# Patient Record
Sex: FEMALE | Race: OTHER
[De-identification: names, ages, dates, MRNs, and addresses within clinical notes are randomized per-mention and may not be internally consistent; named-entity substitution may affect disease eponyms.]

---
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
## 2022-01-21 ENCOUNTER — HOSPITAL ENCOUNTER (EMERGENCY)
Dept: HOSPITAL 76 - ED | Age: 22
Discharge: HOME | End: 2022-01-21
Payer: MEDICAID

## 2022-01-21 VITALS — SYSTOLIC BLOOD PRESSURE: 135 MMHG | DIASTOLIC BLOOD PRESSURE: 87 MMHG

## 2022-01-21 DIAGNOSIS — F43.10: ICD-10-CM

## 2022-01-21 DIAGNOSIS — F41.9: Primary | ICD-10-CM

## 2022-01-21 DIAGNOSIS — F31.62: ICD-10-CM

## 2022-01-21 LAB
AMPHET UR QL SCN: NEGATIVE
ANION GAP SERPL CALCULATED.4IONS-SCNC: 12 MMOL/L (ref 6–13)
APAP SERPL-MCNC: < 10 UG/ML (ref 10–30)
BARBITURATES UR QL SCN>300 NG/ML: NEGATIVE
BASOPHILS NFR BLD AUTO: 0 10^3/UL (ref 0–0.1)
BASOPHILS NFR BLD AUTO: 0.5 %
BENZODIAZ UR QL SCN: NEGATIVE
BUN SERPL-MCNC: 12 MG/DL (ref 6–20)
CALCIUM UR-MCNC: 9.5 MG/DL (ref 8.5–10.3)
CHLORIDE SERPL-SCNC: 102 MMOL/L (ref 101–111)
CO2 SERPL-SCNC: 23 MMOL/L (ref 21–32)
COCAINE UR-SCNC: NEGATIVE UMOL/L
CREAT SERPLBLD-SCNC: 0.7 MG/DL (ref 0.4–1)
EOSINOPHIL # BLD AUTO: 0.2 10^3/UL (ref 0–0.7)
EOSINOPHIL NFR BLD AUTO: 2 %
ERYTHROCYTE [DISTWIDTH] IN BLOOD BY AUTOMATED COUNT: 13.6 % (ref 12–15)
ETHANOL BLD-MCNC: < 5 MG/DL
GFRSERPLBLD MDRD-ARVRAT: 108 ML/MIN/{1.73_M2} (ref 89–?)
GLUCOSE SERPL-MCNC: 89 MG/DL (ref 70–100)
HCG UR QL: NEGATIVE
HCT VFR BLD AUTO: 41.7 % (ref 37–47)
HGB UR QL STRIP: 14 G/DL (ref 12–16)
LYMPHOCYTES # SPEC AUTO: 1.7 10^3/UL (ref 1.5–3.5)
LYMPHOCYTES NFR BLD AUTO: 20.3 %
MCH RBC QN AUTO: 29.2 PG (ref 27–31)
MCHC RBC AUTO-ENTMCNC: 33.6 G/DL (ref 32–36)
MCV RBC AUTO: 87.1 FL (ref 81–99)
METHADONE UR QL SCN: NEGATIVE
METHAMPHET UR QL SCN: NEGATIVE
MONOCYTES # BLD AUTO: 0.9 10^3/UL (ref 0–1)
MONOCYTES NFR BLD AUTO: 10.2 %
NEUTROPHILS # BLD AUTO: 5.6 10^3/UL (ref 1.5–6.6)
NEUTROPHILS # SNV AUTO: 8.5 X10^3/UL (ref 4.8–10.8)
NEUTROPHILS NFR BLD AUTO: 66.6 %
NRBC # BLD AUTO: 0 /100WBC
NRBC # BLD AUTO: 0 X10^3/UL
OPIATES UR QL SCN: NEGATIVE
PDW BLD AUTO: 9.5 FL (ref 7.9–10.8)
PLATELET # BLD: 244 10^3/UL (ref 130–450)
POTASSIUM SERPL-SCNC: 3.3 MMOL/L (ref 3.5–5)
RBC MAR: 4.79 10^6/UL (ref 4.2–5.4)
SALICYLATES SERPL-MCNC: < 6 MG/DL
SODIUM SERPLBLD-SCNC: 137 MMOL/L (ref 135–145)
THC UR QL SCN: POSITIVE
VOLATILE DRUGS POS SERPL SCN: (no result)

## 2022-01-21 PROCEDURE — 99284 EMERGENCY DEPT VISIT MOD MDM: CPT

## 2022-01-21 PROCEDURE — 80329 ANALGESICS NON-OPIOID 1 OR 2: CPT

## 2022-01-21 PROCEDURE — 80307 DRUG TEST PRSMV CHEM ANLYZR: CPT

## 2022-01-21 PROCEDURE — 99283 EMERGENCY DEPT VISIT LOW MDM: CPT

## 2022-01-21 PROCEDURE — 80306 DRUG TEST PRSMV INSTRMNT: CPT

## 2022-01-21 PROCEDURE — 85025 COMPLETE CBC W/AUTO DIFF WBC: CPT

## 2022-01-21 PROCEDURE — 80048 BASIC METABOLIC PNL TOTAL CA: CPT

## 2022-01-21 PROCEDURE — 84443 ASSAY THYROID STIM HORMONE: CPT

## 2022-01-21 PROCEDURE — 36415 COLL VENOUS BLD VENIPUNCTURE: CPT

## 2022-01-21 PROCEDURE — 80320 DRUG SCREEN QUANTALCOHOLS: CPT

## 2022-01-21 PROCEDURE — 81025 URINE PREGNANCY TEST: CPT
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
## 2022-01-21 NOTE — ED PHYSICIAN DOCUMENTATION
PD HPI MHE





- Stated complaint


Stated Complaint: MHE





- Chief complaint


Chief Complaint: MHE





- History obtained from


History obtained from: Patient





- History of Present Illness


Timing - onset: How many days ago (worsening over past week)


Pain level max: 0


Pain level now: 0


Recently seen: Not recently seen





- Additional information


Additional information: 





c/o one week of gradually worsening insomnia, anxiety and tremulousness, 

decreased appetite with decreased PO intake, labile mood (Im OK one minute, 

then crying the next), feels detatched from reality, anhedonia (unable to find 

pleasure in hobbies she used to enjoy, with many of these hobbies having been 

successfully used for coping skills in the past). I ask about thought of self-

harm, and she vaguely indicates she sometimes has these thoughts but she says 

she has no specific plan and adamant that she has no desire to kill herself.





Review of Systems


Constitutional: reports: Reviewed and negative


Cardiac: reports: Reviewed and negative


Respiratory: reports: Reviewed and negative


GI: reports: Reviewed and negative


: denies: Now pregnant EGA


Skin: reports: Reviewed and negative


Musculoskeletal: reports: Reviewed and negative


Neurologic: reports: Reviewed and negative





PD PAST MEDICAL HISTORY





- Past Medical History


Cardiovascular: None


Respiratory: None


Neuro: None


Endocrine/Autoimmune: None


GI: None


GYN: None


: None


HEENT: None


Psych: None


Musculoskeletal: None


Derm: None





- Past Surgical History


Past Surgical History: No





- Present Medications


Home Medications: 


                                Ambulatory Orders











 Medication  Instructions  Recorded  Confirmed


 


Cetirizine [ZyrTEC] 10 mg PO ONCE 01/21/22 01/21/22


 


LORazepam [Ativan] 0.5 - 1 mg PO Q6H PRN #14 tablet 01/21/22 


 


OLANZapine ODT [Zyprexa Odt] 5 mg TL HS #20 tablet 01/21/22 














- Allergies


Allergies/Adverse Reactions: 


                                    Allergies











Allergy/AdvReac Type Severity Reaction Status Date / Time


 


No Known Drug Allergies Allergy   Verified 01/21/22 04:29














- Social History


Does the pt smoke?: No


Smoking Status: Never smoker


Does the pt drink ETOH?: No


Does the pt have substance abuse?: No





- Immunizations


Immunizations are current?: Yes





- POLST


Patient has POLST: No





PD ED PE NORMAL





- Vitals


Vital signs reviewed: Yes





- General


General: Alert and oriented X 3, Well developed/nourished, Other (in providing 

list of symptoms during HPI, she becomes increasingly visually anxious, wringing

her hands and eventually crying and hyperventilating. she is redirectable. 

polite and conversant. )





- HEENT


HEENT: Moist mucous membranes





- Neck


Neck: Supple, no meningeal sign





- Cardiac


Cardiac: RRR, No murmur





- Respiratory


Respiratory: No respiratory distress, Clear bilaterally





- Abdomen


Abdomen: Soft, Non tender





- Neuro


Neuro: Alert and oriented X 3





PD ED PE EXPANDED





- Psych


Psych: Tearful, Anxious





Results





- Vitals


Vitals: 


                                     Oxygen











O2 Source                      Room air

















- Labs


Labs: 


                                Laboratory Tests











  01/21/22 01/21/22 01/21/22





  05:00 05:00 05:10


 


WBC    8.5


 


RBC    4.79


 


Hgb    14.0


 


Hct    41.7


 


MCV    87.1


 


MCH    29.2


 


MCHC    33.6


 


RDW    13.6


 


Plt Count    244


 


MPV    9.5


 


Neut # (Auto)    5.6


 


Lymph # (Auto)    1.7


 


Mono # (Auto)    0.9


 


Eos # (Auto)    0.2


 


Baso # (Auto)    0.0


 


Absolute Nucleated RBC    0.00


 


Nucleated RBC %    0.0


 


Sodium   


 


Potassium   


 


Chloride   


 


Carbon Dioxide   


 


Anion Gap   


 


BUN   


 


Creatinine   


 


Estimated GFR (MDRD)   


 


Glucose   


 


Calcium   


 


TSH   


 


Urine HCG, Qual   NEGATIVE 


 


Salicylates   


 


Urine Opiates Screen  NEGATIVE  


 


Ur Oxycodone Screen  NEGATIVE  


 


Urine Methadone Screen  NEGATIVE  


 


Ur Propoxyphene Screen  NEGATIVE  


 


Acetaminophen   


 


Ur Barbiturates Screen  NEGATIVE  


 


Ur Tricyclics Screen  NEGATIVE  


 


Ur Phencyclidine Scrn  NEGATIVE  


 


Ur Amphetamine Screen  NEGATIVE  


 


U Methamphetamines Scrn  NEGATIVE  


 


U Benzodiazepines Scrn  NEGATIVE  


 


Urine Cocaine Screen  NEGATIVE  


 


U Cannabinoids Screen  POSITIVE H  


 


Ethyl Alcohol   














  01/21/22 01/21/22 01/21/22





  05:10 05:10 05:10


 


WBC   


 


RBC   


 


Hgb   


 


Hct   


 


MCV   


 


MCH   


 


MCHC   


 


RDW   


 


Plt Count   


 


MPV   


 


Neut # (Auto)   


 


Lymph # (Auto)   


 


Mono # (Auto)   


 


Eos # (Auto)   


 


Baso # (Auto)   


 


Absolute Nucleated RBC   


 


Nucleated RBC %   


 


Sodium  137  


 


Potassium  3.3 L  


 


Chloride  102  


 


Carbon Dioxide  23  


 


Anion Gap  12.0  


 


BUN  12  


 


Creatinine  0.7  


 


Estimated GFR (MDRD)  108  


 


Glucose  89  


 


Calcium  9.5  


 


TSH   1.22 


 


Urine HCG, Qual   


 


Salicylates    < 6.0


 


Urine Opiates Screen   


 


Ur Oxycodone Screen   


 


Urine Methadone Screen   


 


Ur Propoxyphene Screen   


 


Acetaminophen    < 10 L


 


Ur Barbiturates Screen   


 


Ur Tricyclics Screen   


 


Ur Phencyclidine Scrn   


 


Ur Amphetamine Screen   


 


U Methamphetamines Scrn   


 


U Benzodiazepines Scrn   


 


Urine Cocaine Screen   


 


U Cannabinoids Screen   


 


Ethyl Alcohol    < 5.0














PD MEDICAL DECISION MAKING





- ED course


Complexity details: reviewed old records, reviewed results, re-evaluated 

patient, considered differential, d/w patient


ED course: 





patient presents to ED requesting help, as she feels her symptoms have gotten 

beyond her control of late. She exhibits some of the behaviors, seemingly 

unintentionally, that have been a problem: when describing her symptoms, she 

becomes increasingly anxious, wringing hands, starting to rock back and forth 

subtly, and then crying. I ask her to stop for just a moment and she takes a 

deep breath and acknowledges she was once again getting wound up. In this way, 

she is easily redirectable. I discussed options,such as telepsychiatry, social 

work. In futher discussing these options, she again began to become overwhelmed 

and defers to me. Consults for both services are ordered.


She is given a dose of lorazepam in ED with good effect; she was far more calm, 

both visually and by her report. 


Telepsych consult recommends QHS zyprexa and this is transmitted to her 

pharmacy. I also transmitted a brief course of lorazepam for day use. I advised 

patient that lorazepam is to be used sparingly and is potentially addictive, and

is a poor choice for long-term control of anxiety. She has expressed all along 

on this visit that she does not want mediation that just makes her groggy or 

sleep too much. After telepsych , patient says she has a ride (relative) waiting

outside and that she (patient) feels safe being discharged. She is again offered

SW consult, as they might be able to expedite outpatient f/u, but patient 

declines. She is able to contract for safety, will return if she feels unsafe at

any time, and she will pursue outpatient follow up





Departure





- Departure


Disposition: 01 Home, Self Care


Clinical Impression: 


 Anxiety





Condition: Good


Instructions:  Anxiety Disorder


Prescriptions: 


LORazepam [Ativan] 0.5 - 1 mg PO Q6H PRN #14 tablet


 PRN Reason: Anxiety


OLANZapine ODT [Zyprexa Odt] 5 mg TL HS #20 tablet


Comments: 


Prescriptions for olanzepine (for sleep) and lorazepam (for anxiety) have been 

electronically submitted to [facility] pharmacy in [place]. 


You can two tablets of the olanzepine for sleep, but if makes you sleep too long

or feel drowsy or sleepy the next day, decrease the dose to one tablet. 





Discharge Date/Time: 01/21/22 10:22

## 2022-02-13 ENCOUNTER — HOSPITAL ENCOUNTER (OUTPATIENT)
Dept: HOSPITAL 76 - EMS | Age: 22
Discharge: ANOTHER HOSPITAL | End: 2022-02-13
Payer: MEDICAID

## 2022-02-13 ENCOUNTER — HOSPITAL ENCOUNTER (EMERGENCY)
Dept: HOSPITAL 76 - ED | Age: 22
Discharge: HOME | End: 2022-02-13
Payer: MEDICAID

## 2022-02-13 VITALS — DIASTOLIC BLOOD PRESSURE: 94 MMHG | SYSTOLIC BLOOD PRESSURE: 139 MMHG

## 2022-02-13 DIAGNOSIS — F31.9: ICD-10-CM

## 2022-02-13 DIAGNOSIS — S71.111A: ICD-10-CM
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
## 2022-02-13 DIAGNOSIS — F43.0: Primary | ICD-10-CM

## 2022-02-13 DIAGNOSIS — R45.89: Primary | ICD-10-CM

## 2022-02-13 DIAGNOSIS — F43.10: ICD-10-CM
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
## 2022-02-13 DIAGNOSIS — F10.129: ICD-10-CM

## 2022-02-13 DIAGNOSIS — T43.596A: ICD-10-CM

## 2022-02-13 DIAGNOSIS — F41.9: ICD-10-CM

## 2022-02-13 DIAGNOSIS — Z91.138: ICD-10-CM

## 2022-02-13 DIAGNOSIS — G47.00: ICD-10-CM

## 2022-02-13 DIAGNOSIS — Z81.8: ICD-10-CM

## 2022-02-13 LAB
ALBUMIN DIAFP-MCNC: 4 G/DL (ref 3.2–5.5)
ALBUMIN/GLOB SERPL: 1.2 {RATIO} (ref 1–2.2)
ALP SERPL-CCNC: 56 IU/L (ref 42–121)
ALT SERPL W P-5'-P-CCNC: 21 IU/L (ref 10–60)
AMPHET UR QL SCN: NEGATIVE
ANION GAP SERPL CALCULATED.4IONS-SCNC: 11 MMOL/L (ref 6–13)
APAP SERPL-MCNC: < 10 UG/ML (ref 10–30)
AST SERPL W P-5'-P-CCNC: 19 IU/L (ref 10–42)
B-HCG SERPL QL: NEGATIVE
BARBITURATES UR QL SCN>300 NG/ML: NEGATIVE
BASOPHILS NFR BLD AUTO: 0.1 10^3/UL (ref 0–0.1)
BASOPHILS NFR BLD AUTO: 0.7 %
BENZODIAZ UR QL SCN: NEGATIVE
BILIRUB BLD-MCNC: 0.9 MG/DL (ref 0.2–1)
BUN SERPL-MCNC: 8 MG/DL (ref 6–20)
CALCIUM UR-MCNC: 8.9 MG/DL (ref 8.5–10.3)
CHLORIDE SERPL-SCNC: 107 MMOL/L (ref 101–111)
CLARITY UR REFRACT.AUTO: CLEAR
CO2 SERPL-SCNC: 20 MMOL/L (ref 21–32)
COCAINE UR-SCNC: NEGATIVE UMOL/L
CREAT SERPLBLD-SCNC: 0.6 MG/DL (ref 0.4–1)
EOSINOPHIL # BLD AUTO: 0.8 10^3/UL (ref 0–0.7)
EOSINOPHIL NFR BLD AUTO: 8.6 %
ERYTHROCYTE [DISTWIDTH] IN BLOOD BY AUTOMATED COUNT: 14.6 % (ref 12–15)
ETHANOL BLD-MCNC: 125.5 MG/DL
GFRSERPLBLD MDRD-ARVRAT: 129 ML/MIN/{1.73_M2} (ref 89–?)
GLOBULIN SER-MCNC: 3.3 G/DL (ref 2.1–4.2)
GLUCOSE SERPL-MCNC: 92 MG/DL (ref 70–100)
GLUCOSE UR QL STRIP.AUTO: NEGATIVE MG/DL
HCT VFR BLD AUTO: 41.8 % (ref 37–47)
HGB UR QL STRIP: 13.8 G/DL (ref 12–16)
KETONES UR QL STRIP.AUTO: NEGATIVE MG/DL
LIPASE SERPL-CCNC: 37 U/L (ref 22–51)
LYMPHOCYTES # SPEC AUTO: 3.2 10^3/UL (ref 1.5–3.5)
LYMPHOCYTES NFR BLD AUTO: 33.8 %
MCH RBC QN AUTO: 28.5 PG (ref 27–31)
MCHC RBC AUTO-ENTMCNC: 33 G/DL (ref 32–36)
MCV RBC AUTO: 86.4 FL (ref 81–99)
METHADONE UR QL SCN: NEGATIVE
METHAMPHET UR QL SCN: NEGATIVE
MONOCYTES # BLD AUTO: 0.9 10^3/UL (ref 0–1)
MONOCYTES NFR BLD AUTO: 9.1 %
NEUTROPHILS # BLD AUTO: 4.5 10^3/UL (ref 1.5–6.6)
NEUTROPHILS # SNV AUTO: 9.5 X10^3/UL (ref 4.8–10.8)
NEUTROPHILS NFR BLD AUTO: 47.2 %
NITRITE UR QL STRIP.AUTO: NEGATIVE
NRBC # BLD AUTO: 0 /100WBC
NRBC # BLD AUTO: 0 X10^3/UL
OPIATES UR QL SCN: NEGATIVE
PDW BLD AUTO: 9 FL (ref 7.9–10.8)
PH UR STRIP.AUTO: 5.5 PH (ref 5–7.5)
PLATELET # BLD: 323 10^3/UL (ref 130–450)
POTASSIUM SERPL-SCNC: 4.1 MMOL/L (ref 3.5–5)
PROT SPEC-MCNC: 7.3 G/DL (ref 6.7–8.2)
PROT UR STRIP.AUTO-MCNC: NEGATIVE MG/DL
RBC # UR STRIP.AUTO: (no result) /UL
RBC # URNS HPF: (no result) /HPF (ref 0–5)
RBC MAR: 4.84 10^6/UL (ref 4.2–5.4)
SALICYLATES SERPL-MCNC: < 6 MG/DL
SODIUM SERPLBLD-SCNC: 138 MMOL/L (ref 135–145)
SP GR UR STRIP.AUTO: >=1.03 (ref 1–1.03)
SQUAMOUS URNS QL MICRO: (no result)
THC UR QL SCN: POSITIVE
UROBILINOGEN UR QL STRIP.AUTO: (no result) E.U./DL
UROBILINOGEN UR STRIP.AUTO-MCNC: NEGATIVE MG/DL
VOLATILE DRUGS POS SERPL SCN: (no result)
WBC # UR MANUAL: (no result) /HPF (ref 0–5)

## 2022-02-13 PROCEDURE — 84703 CHORIONIC GONADOTROPIN ASSAY: CPT

## 2022-02-13 PROCEDURE — 99284 EMERGENCY DEPT VISIT MOD MDM: CPT

## 2022-02-13 PROCEDURE — 80306 DRUG TEST PRSMV INSTRMNT: CPT

## 2022-02-13 PROCEDURE — 80307 DRUG TEST PRSMV CHEM ANLYZR: CPT

## 2022-02-13 PROCEDURE — 80329 ANALGESICS NON-OPIOID 1 OR 2: CPT

## 2022-02-13 PROCEDURE — 87086 URINE CULTURE/COLONY COUNT: CPT

## 2022-02-13 PROCEDURE — 36415 COLL VENOUS BLD VENIPUNCTURE: CPT

## 2022-02-13 PROCEDURE — 85025 COMPLETE CBC W/AUTO DIFF WBC: CPT

## 2022-02-13 PROCEDURE — 99283 EMERGENCY DEPT VISIT LOW MDM: CPT

## 2022-02-13 PROCEDURE — 81001 URINALYSIS AUTO W/SCOPE: CPT

## 2022-02-13 PROCEDURE — 80320 DRUG SCREEN QUANTALCOHOLS: CPT

## 2022-02-13 PROCEDURE — 80053 COMPREHEN METABOLIC PANEL: CPT

## 2022-02-13 PROCEDURE — 81003 URINALYSIS AUTO W/O SCOPE: CPT

## 2022-02-13 PROCEDURE — 84443 ASSAY THYROID STIM HORMONE: CPT

## 2022-02-13 PROCEDURE — 83690 ASSAY OF LIPASE: CPT

## 2022-02-13 NOTE — ED PHYSICIAN DOCUMENTATION
ED Addendum





- Addendum


Addendum: 





02/13/22 13:41The patient remained calm cooperative and interacting well here in

the ER since change of shift.  The prior provider had requested a telepsych 

consult but as of this point the still were not able to do that because of 

consult volume.  Social work instead talked with the patient and her  and

they were both feeling comfortable with her being home.  The patient contracted 

for safety with social work.  They did need a refill of their prescription that 

had been transmitted to the wrong pharmacy.  We tried contacting [facility]

and see if they could transfer it but it was going to take a few days apparently

according to [facility].  I therefore just represcribed it.





The patient's says that she feels comfortable not do any self-harm.  She told me

she would avoid alcohol.  She feels comfortable going home.





Disposition: The patient is discharged from the ER stable to home





Diagnoses:


1.  Acute stress reaction


2.  Intentional self cutting


3.  Anxiety disorder


4.  Alcohol intoxication     Overnight events noted   VITAL: T(C): , Max: 36.7 (03-02-21 @ 09:13) T(F): , Max: 98 (03-02-21 @ 09:13) HR: 65 (03-03-21 @ 04:48) BP: 111/65 (03-03-21 @ 04:48) BP(mean): -- RR: 18 (03-03-21 @ 04:48) SpO2: 98% (03-03-21 @ 04:48)   PHYSICAL EXAM: Constitutional: NAD, Alert HEENT: NCAT, MMM Neck: Supple, No JVD Respiratory: CTA-R; decreased BS L base Cardiovascular: RRR s1s2, no m/r/g Gastrointestinal: BS+, soft, NT/ND Extremities: No peripheral edema b/l Neurological: no focal deficits; strength grossly intact Back: no CVAT b/l Skin: No rashes, no nevi Access: LUE AVF (+)thrill LABS:                      12.5  4.00  )-----------( 96       ( 02 Mar 2021 03:34 )            40.2   Na(147)/K(5.6)/Cl(101)/HCO3(26)/BUN(83)/Cr(5.88)Glu(190)/Ca(10.6)/Mg(--)/PO4(--)    03-02 @ 06:30 Na(145)/K(6.1)/Cl(98)/HCO3(24)/BUN(84)/Cr(6.01)Glu(125)/Ca(11.1)/Mg(--)/PO4(--)    03-02 @ 03:36 Na(144)/K(5.9)/Cl(98)/HCO3(26)/BUN(84)/Cr(6.07)Glu(124)/Ca(11.2)/Mg(2.9)/PO4(5.8)    03-02 @ 03:34    IMPRESSION: 71M w/ dementia, CAD, past epidural abscess, and ESRD, 3/2/21 a/w AMS  (1)Renal - ESRD - HD TTS - due for next HD tomorrow (2)Hyperkalemia - should now be improved, s/p HD yesterday (no labs of yet for today) (3)Hypercalcemia - primary hyperparathyroidism - on Sensipar (4)AMS - sepsis-associated? Now on Zosyn   RECOMMEND: (1)Next HD tomorrow - low K/low Ca bath (2)Sensipar as ordered (3)Continue abx for GFR<10/HD    Heath Huff MD [facility] Office: [phone] Cell: [phone]        No pain, no sob   VITAL: T(C): , Max: 36.7 (03-02-21 @ 09:13) T(F): , Max: 98 (03-02-21 @ 09:13) HR: 65 (03-03-21 @ 04:48) BP: 111/65 (03-03-21 @ 04:48) RR: 18 (03-03-21 @ 04:48) SpO2: 98% (03-03-21 @ 04:48)   PHYSICAL EXAM: Constitutional: lethargic but alert (improved from yesterday) HEENT: NCAT, MMM Neck: Supple, No JVD Respiratory: CTA-R; decreased BS L base Cardiovascular: RRR s1s2, no m/r/g Gastrointestinal: BS+, soft, NT/ND Extremities: No peripheral edema b/l Neurological: no focal deficits; strength grossly intact Back: no CVAT b/l Skin: No rashes, no nevi Access: LUE AVF (+)thrill LABS:                      12.5  4.00  )-----------( 96       ( 02 Mar 2021 03:34 )            40.2   Na(147)/K(5.6)/Cl(101)/HCO3(26)/BUN(83)/Cr(5.88)Glu(190)/Ca(10.6)/Mg(--)/PO4(--)    03-02 @ 06:30 Na(145)/K(6.1)/Cl(98)/HCO3(24)/BUN(84)/Cr(6.01)Glu(125)/Ca(11.1)/Mg(--)/PO4(--)    03-02 @ 03:36 Na(144)/K(5.9)/Cl(98)/HCO3(26)/BUN(84)/Cr(6.07)Glu(124)/Ca(11.2)/Mg(2.9)/PO4(5.8)    03-02 @ 03:34    IMPRESSION: 71M w/ dementia, CAD, past epidural abscess, and ESRD, 3/2/21 a/w AMS  (1)Renal - ESRD - HD TTS - due for next HD tomorrow (2)Hyperkalemia - should now be improved, s/p HD yesterday (no labs of yet for today) (3)Hypercalcemia - primary hyperparathyroidism - on Sensipar (4)AMS - sepsis-associated? Now on Zosyn   RECOMMEND: (1)Next HD tomorrow - low K/low Ca bath (2)Sensipar as ordered (3)Continue abx for GFR<10/HD    Heath Huff MD [facility] Office: [phone] Cell: [phone]
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
## 2022-04-19 ENCOUNTER — HOSPITAL ENCOUNTER (EMERGENCY)
Dept: HOSPITAL 76 - ED | Age: 22
Discharge: HOME | End: 2022-04-19
Payer: COMMERCIAL

## 2022-04-19 VITALS — SYSTOLIC BLOOD PRESSURE: 117 MMHG | DIASTOLIC BLOOD PRESSURE: 70 MMHG

## 2022-04-19 DIAGNOSIS — R10.13: ICD-10-CM

## 2022-04-19 DIAGNOSIS — R11.2: Primary | ICD-10-CM

## 2022-04-19 LAB
ALBUMIN DIAFP-MCNC: 4.4 G/DL (ref 3.2–5.5)
ALBUMIN/GLOB SERPL: 1.2 {RATIO} (ref 1–2.2)
ALP SERPL-CCNC: 63 IU/L (ref 42–121)
ALT SERPL W P-5'-P-CCNC: 27 IU/L (ref 10–60)
ANION GAP SERPL CALCULATED.4IONS-SCNC: 7 MMOL/L (ref 6–13)
AST SERPL W P-5'-P-CCNC: 23 IU/L (ref 10–42)
BASOPHILS NFR BLD AUTO: 0.1 10^3/UL (ref 0–0.1)
BASOPHILS NFR BLD AUTO: 1 %
BILIRUB BLD-MCNC: 1 MG/DL (ref 0.2–1)
BUN SERPL-MCNC: 9 MG/DL (ref 6–20)
CALCIUM UR-MCNC: 9.2 MG/DL (ref 8.5–10.3)
CHLORIDE SERPL-SCNC: 105 MMOL/L (ref 101–111)
CLARITY UR REFRACT.AUTO: CLEAR
CO2 SERPL-SCNC: 27 MMOL/L (ref 21–32)
CREAT SERPLBLD-SCNC: 0.6 MG/DL (ref 0.4–1)
EOSINOPHIL # BLD AUTO: 0.8 10^3/UL (ref 0–0.7)
EOSINOPHIL NFR BLD AUTO: 8.6 %
ERYTHROCYTE [DISTWIDTH] IN BLOOD BY AUTOMATED COUNT: 13 % (ref 12–15)
GFRSERPLBLD MDRD-ARVRAT: 129 ML/MIN/{1.73_M2} (ref 89–?)
GLOBULIN SER-MCNC: 3.6 G/DL (ref 2.1–4.2)
GLUCOSE SERPL-MCNC: 93 MG/DL (ref 70–100)
GLUCOSE UR QL STRIP.AUTO: NEGATIVE MG/DL
HCG UR QL: NEGATIVE
HCT VFR BLD AUTO: 46 % (ref 37–47)
HGB UR QL STRIP: 14.8 G/DL (ref 12–16)
KETONES UR QL STRIP.AUTO: NEGATIVE MG/DL
LIPASE SERPL-CCNC: 32 U/L (ref 22–51)
LYMPHOCYTES # SPEC AUTO: 2.4 10^3/UL (ref 1.5–3.5)
LYMPHOCYTES NFR BLD AUTO: 27.5 %
MCH RBC QN AUTO: 28.9 PG (ref 27–31)
MCHC RBC AUTO-ENTMCNC: 32.2 G/DL (ref 32–36)
MCV RBC AUTO: 89.8 FL (ref 81–99)
MONOCYTES # BLD AUTO: 0.6 10^3/UL (ref 0–1)
MONOCYTES NFR BLD AUTO: 6.6 %
NEUTROPHILS # BLD AUTO: 4.9 10^3/UL (ref 1.5–6.6)
NEUTROPHILS # SNV AUTO: 8.7 X10^3/UL (ref 4.8–10.8)
NEUTROPHILS NFR BLD AUTO: 56.1 %
NITRITE UR QL STRIP.AUTO: NEGATIVE
NRBC # BLD AUTO: 0 /100WBC
NRBC # BLD AUTO: 0 X10^3/UL
PDW BLD AUTO: 9.5 FL (ref 7.9–10.8)
PH UR STRIP.AUTO: 8 PH (ref 5–7.5)
PLATELET # BLD: 287 10^3/UL (ref 130–450)
POTASSIUM SERPL-SCNC: 4.3 MMOL/L (ref 3.5–5)
PROT SPEC-MCNC: 8 G/DL (ref 6.7–8.2)
PROT UR STRIP.AUTO-MCNC: NEGATIVE MG/DL
RBC # UR STRIP.AUTO: NEGATIVE /UL
RBC MAR: 5.12 10^6/UL (ref 4.2–5.4)
SODIUM SERPLBLD-SCNC: 139 MMOL/L (ref 135–145)
SP GR UR STRIP.AUTO: 1.02 (ref 1–1.03)
UROBILINOGEN UR QL STRIP.AUTO: (no result) E.U./DL
UROBILINOGEN UR STRIP.AUTO-MCNC: NEGATIVE MG/DL

## 2022-04-19 PROCEDURE — 96375 TX/PRO/DX INJ NEW DRUG ADDON: CPT

## 2022-04-19 PROCEDURE — 96374 THER/PROPH/DIAG INJ IV PUSH: CPT

## 2022-04-19 PROCEDURE — 74177 CT ABD & PELVIS W/CONTRAST: CPT

## 2022-04-19 PROCEDURE — 36415 COLL VENOUS BLD VENIPUNCTURE: CPT

## 2022-04-19 PROCEDURE — 80053 COMPREHEN METABOLIC PANEL: CPT

## 2022-04-19 PROCEDURE — 81003 URINALYSIS AUTO W/O SCOPE: CPT

## 2022-04-19 PROCEDURE — 99284 EMERGENCY DEPT VISIT MOD MDM: CPT

## 2022-04-19 PROCEDURE — 99283 EMERGENCY DEPT VISIT LOW MDM: CPT

## 2022-04-19 PROCEDURE — 85025 COMPLETE CBC W/AUTO DIFF WBC: CPT

## 2022-04-19 PROCEDURE — 83690 ASSAY OF LIPASE: CPT

## 2022-04-19 PROCEDURE — 81025 URINE PREGNANCY TEST: CPT

## 2022-04-19 PROCEDURE — 81001 URINALYSIS AUTO W/SCOPE: CPT

## 2022-04-19 PROCEDURE — 87086 URINE CULTURE/COLONY COUNT: CPT

## 2022-04-19 NOTE — CT REPORT
PROCEDURE:  Abdomen/Pelvis W

 

INDICATIONS:  periumbilical pain/vomiting

 

CONTRAST:  IV CONTRAST: Optiray 320 ml: 100 PO CONTRAST: *NO PO CONTRAST  

 

TECHNIQUE:  

After the administration of intravenous contrast, 5 mm thick sections acquired from the diaphragms to
 the symphysis.  5 mm thick coronal and sagittal reformats were acquired.  For radiation dose reducti
on, the following was used:  automated exposure control, adjustment of mA and/or kV according to livier
ent size.  

 

COMPARISON:  None.

 

FINDINGS:  

Image quality: Patient motion artifact.  

 

ABDOMEN:  

Lung bases:  Lung bases are clear.  Heart size is normal.  

 

Solid organs:  Liver and spleen are normal in size and enhancement.

 

The gallbladder is likely secondary to patient motion artifact. This gives the appearance of gallblad
werner wall thickening. Biliary system is non dilated.  Pancreas enhances normally.  No adrenal nodules.
  Kidneys demonstrate normal size and enhancement, without hydronephrosis.  

 

Peritoneum and bowel:  Bowel loops demonstrate normal wall thickness and caliber.  No free fluid or a
ir.  

 

Nodes and vessels:  No retroperitoneal or mesenteric adenopathy by size criteria.  Aorta and inferior
 vena cava are normal in size.  Incidental note is made of the presence of a dilated left canal vein 
indicating reflux with associated left paraovarian varicosities.

 

Miscellaneous:  No ventral hernias.  

 

 

PELVIS:  

Genitourinary:  Bladder wall thickness is normal.  

 

Miscellaneous:  No inguinal hernias or adenopathy.  2.0 cm left adnexal cyst.

 

Bones:  No suspicious bony lesions.  No vertebral body compression fractures.  

 

IMPRESSION:  

 

1. There is patient motion artifact on the study.

 

2. Patient motion artifact likely results in the appearance of the gallbladder, as opposed to the pre
sence of renal gallbladder wall thickening. However, if there is suspicion of gallstone disease, cons
ider right upper quadrant ultrasound.

 

3. Nonspecific 2.0 cm left adnexal cyst.

 

4. Incidental note made of left gonadal vein reflux with left paraovarian varicosities.

 

Comment: In certain patients, left gonadal vein reflux with paraovarian varicosities can correlate wi
th the clinical syndrome of chronic pelvic venous congestion.

 

Reviewed by: Vincent Marx MD on 4/19/2022 8:35 PM PDT

Approved by: Vincent Marx MD on 4/19/2022 8:35 PM PDT

 

 

Station ID:  [id number]

## 2022-04-19 NOTE — ED PHYSICIAN DOCUMENTATION
PD HPI NVD





- Stated complaint


Stated Complaint: VOMIT MUCUS/CONGESTION





- Chief complaint


Chief Complaint: Abd Pain





- History obtained from


History obtained from: Patient





- Additonal information


Additional information: 


Patient is a 21-year-old female with no significant past medical history 

presenting for evaluation of nausea and vomiting that has been present for 3 

days.  Patient reports not being able to eat or drink very much at any time as 

she will vomit 10 to 15 minutes later.  She also reports in the morning she is 

vomiting mucus.  Nonbloody nonbilious.  She has had 1 loose stool each day for 

the last 3 days.  She describes a vague generalized abdominal discomfort.  No 

dysuria, hematuria.  No vaginal discharge or pelvic pain.  No sick contacts, 

recent antibiotic use, travel.Nothing makes her symptoms better.








Review of Systems


Constitutional: denies: Fever


Nose: denies: Congestion


Cardiac: denies: Chest pain / pressure, Palpitations


Respiratory: denies: Dyspnea, Cough


GI: reports: Abdominal Pain, Nausea, Vomiting, Diarrhea


: denies: Dysuria, Hematuria, Discharge, Vaginal bleeding


Skin: denies: Rash


Musculoskeletal: denies: Back pain


Neurologic: denies: Generalized weakness, Headache





PD PAST MEDICAL HISTORY





- Past Medical History


Cardiovascular: None


Respiratory: None


Neuro: None


Endocrine/Autoimmune: None


GI: None


GYN: None


: None


HEENT: None


Psych: None


Musculoskeletal: None


Derm: None


Other Past Medical History: seasonal allergies





- Past Surgical History


Past Surgical History: No





- Present Medications


Home Medications: 


                                Ambulatory Orders











 Medication  Instructions  Recorded  Confirmed


 


OLANZapine [Zyprexa] 10 mg PO HS 30 Days #30 tablet 02/13/22 04/19/22


 


hydrOXYzine pamoate [Hydroxyzine 25 mg PO BID PRN #20 cap 02/13/22 04/19/22





Pamoate]   


 


Cetirizine HCl [Zyrtec] 10 mg PO DAILY 04/19/22 04/19/22


 


Ondansetron Odt [Zofran] 4 mg TL Q6H PRN #10 tablet 04/19/22 














- Allergies


Allergies/Adverse Reactions: 


                                    Allergies











Allergy/AdvReac Type Severity Reaction Status Date / Time


 


iodine AdvReac  Hives Verified 04/19/22 21:08














- Social History


Does the pt smoke?: No


Smoking Status: Never smoker


Does the pt drink ETOH?: No


Does the pt have substance abuse?: No





- Immunizations


Immunizations are current?: Yes





- POLST


Patient has POLST: No





PD ED PE NORMAL





- General


General: Alert and oriented X 3, No acute distress, Well developed/nourished





- HEENT


HEENT: Atraumatic, Moist mucous membranes





- Neck


Neck: Supple, no meningeal sign





- Cardiac


Cardiac: RRR, No murmur, Strong equal pulses





- Respiratory


Respiratory: No respiratory distress, Clear bilaterally





- Abdomen


Abdomen: Normal bowel sounds, Soft, Non distended, Other (Periumbilical 

tenderness, no rebound, no guarding, no masses)





- Derm


Derm: Normal color, Warm and dry





- Extremities


Extremities: No deformity, No edema





- Neuro


Neuro: Alert and oriented X 3, No motor deficit, Normal speech





- Psych


Psych: Normal mood, Normal affect





Results





- Vitals


Vitals: 


                               Vital Signs - 24 hr











  04/19/22 04/19/22 04/19/22





  16:53 16:55 18:55


 


Temperature 36.7 C  


 


Heart Rate 87 87 87


 


Respiratory 16 16 16





Rate   


 


Blood Pressure 120/75 120/75 120/75


 


O2 Saturation 99 99 99














  04/19/22 04/19/22





  20:00 21:40


 


Temperature  


 


Heart Rate 95 95


 


Respiratory 18 18





Rate  


 


Blood Pressure 117/70 117/70


 


O2 Saturation 99 99








                                     Oxygen











O2 Source                      Room air

















- Labs


Labs: 


                                Laboratory Tests











  04/19/22 04/19/22 04/19/22





  17:04 17:05 17:05


 


WBC   8.7 


 


RBC   5.12 


 


Hgb   14.8 


 


Hct   46.0 


 


MCV   89.8 


 


MCH   28.9 


 


MCHC   32.2 


 


RDW   13.0 


 


Plt Count   287 


 


MPV   9.5 


 


Neut # (Auto)   4.9 


 


Lymph # (Auto)   2.4 


 


Mono # (Auto)   0.6 


 


Eos # (Auto)   0.8 H 


 


Baso # (Auto)   0.1 


 


Absolute Nucleated RBC   0.00 


 


Nucleated RBC %   0.0 


 


Sodium    139


 


Potassium    4.3


 


Chloride    105


 


Carbon Dioxide    27


 


Anion Gap    7.0


 


BUN    9


 


Creatinine    0.6


 


Estimated GFR (MDRD)    129


 


Glucose    93


 


Calcium    9.2


 


Total Bilirubin    1.0


 


AST    23


 


ALT    27


 


Alkaline Phosphatase    63


 


Total Protein    8.0


 


Albumin    4.4


 


Globulin    3.6


 


Albumin/Globulin Ratio    1.2


 


Lipase    32


 


Urine Color  YELLOW  


 


Urine Clarity  CLEAR  


 


Urine pH  8.0 H  


 


Ur Specific Gravity  1.025  


 


Urine Protein  NEGATIVE  


 


Urine Glucose (UA)  NEGATIVE  


 


Urine Ketones  NEGATIVE  


 


Urine Occult Blood  NEGATIVE  


 


Urine Nitrite  NEGATIVE  


 


Urine Bilirubin  NEGATIVE  


 


Urine Urobilinogen  0.2 (NORMAL)  


 


Ur Leukocyte Esterase  NEGATIVE  


 


Ur Microscopic Review  NOT INDICATED  


 


Urine Culture Comments  NOT INDICATED  


 


Urine HCG, Qual  NEGATIVE  














PD MEDICAL DECISION MAKING





- ED course


Complexity details: reviewed results, re-evaluated patient, d/w patient


ED course: 


Patient presenting for evaluation of Periumbilical abdominal pain with nausea 

and vomiting.  She has mild tenderness on exam but overall labs are reassuring 

and vital signs are stable.  CT was obtained with incidental findings of ovarian

 cyst and possible findings to suggest chronic pelvic congestion syndrome.  I 

did review these findings with the patient and encouraged close follow-up with 

primary care doctor or gynecologist.  Patient's was given medications to help 

with her symptoms and is able to tolerate p.o. prior to discharge.  She did have

 some hives after receiving IV contrast andWas given Benadryl with quick 

improvement.  No respiratory symptoms.Patient advised on strict return 

precautions. No lower abdominal tenderness to suggest pelvic etiology.








Departure





- Departure


Disposition: 01 Home, Self Care


Clinical Impression: 


 Epigastric abdominal pain





Nausea & vomiting


Qualifiers:


 Vomiting type: unspecified Qualified Code(s): R11.2 - Nausea with vomiting, 

unspecified





Condition: Stable


Instructions:  ED Abdominal Pain Female Non-Specific Abdominal Pain, ED Diet 

Vomiting Diarrhea


Prescriptions: 


Ondansetron Odt [Zofran] 4 mg TL Q6H PRN #10 tablet


 PRN Reason: Nausea / Vomiting


Comments: 


You were evaluated for nausea and vomiting and pain around your umbilicus.  Your

 vital signs and labs were reassuring.  You had a CT scan which did not reveal a

 cause for your pain but did have a few incidental findings.  You have a cyst on

 your left ovary.  There is also mention of abnormalities regarding the veins in

 your pelvis which could be causing increased pelvic pain related to chronic 

pelvis venous congestion.  Please follow-up with your primary care doctor 

through the [place] [facility].You may need a referral to a GI doctor regarding your 

nausea and vomiting if it does not improve as well as to a gynecologist 

regarding the abnormality seen on your CT scan.A prescription for antinausea 

medication was sent to the [facility] pharmacy in [place].





CT FINDINGS - 


Nonspecific 2.0 cm left adnexal cyst. 





Incidental note made of left gonadal vein reflux with left paraovarian 

varicosities. 





Comment: In certain patients, left gonadal vein reflux with paraovarian varicosi

ties can correlate 


with the clinical syndrome of chronic pelvic venous congestion. 





Forms:  Activity restrictions


Discharge Date/Time: 04/19/22 21:40